# Patient Record
Sex: MALE | Race: WHITE | NOT HISPANIC OR LATINO | URBAN - METROPOLITAN AREA
[De-identification: names, ages, dates, MRNs, and addresses within clinical notes are randomized per-mention and may not be internally consistent; named-entity substitution may affect disease eponyms.]

---

## 2017-05-10 ENCOUNTER — OFFICE VISIT - HEALTHEAST (OUTPATIENT)
Dept: FAMILY MEDICINE | Facility: CLINIC | Age: 47
End: 2017-05-10

## 2017-05-10 DIAGNOSIS — R05.8 POST-VIRAL COUGH SYNDROME: ICD-10-CM

## 2017-05-10 DIAGNOSIS — I10 ESSENTIAL HYPERTENSION: ICD-10-CM

## 2017-05-10 ASSESSMENT — MIFFLIN-ST. JEOR: SCORE: 1654.84

## 2021-05-31 VITALS — WEIGHT: 178 LBS | BODY MASS INDEX: 26.36 KG/M2 | HEIGHT: 69 IN

## 2021-06-10 NOTE — PROGRESS NOTES
"  Chief Complaint   Patient presents with     Cough     x2 wks - dry & non-productive, no fever         HPI:   Damion Gomes is a 46 y.o. male c/o cough for the last 2 weeks, non productive, no shortness of breath. Some post nasal drainage.  C/o problems with allergies.  Son was sick with cold.  Wondering about BP.  He monitors it at home and the bottom number is usually 90 or above.  Father has hypertension.  Identical twin brother doesn't.  He states he has had a lot of stress.  He has lost 30 lbs, smokes only an occasional cigarette, doesn't add salt to food, hasn't been exercising on a regular basis now.    ROS:  A 12 point comprehensive review of systems was negative except as noted.     Medications:  No current outpatient prescriptions on file prior to visit.     No current facility-administered medications on file prior to visit.          Social History:  Social History   Substance Use Topics     Smoking status: Former Smoker     Packs/day: 1.00     Years: 25.00     Smokeless tobacco: Not on file     Alcohol use Not on file         Physical Exam:   Vitals:    05/10/17 1147   BP: (!) 138/100   Patient Site: Left Arm   Patient Position: Sitting   Cuff Size: Adult Regular   Pulse: 88   Resp: 16   Temp: 98.1  F (36.7  C)   TempSrc: Oral   SpO2: 98%   Weight: 178 lb (80.7 kg)   Height: 5' 8.5\" (1.74 m)       GENERAL:   Alert. Oriented.  EYES: Clear  HENT:  Ears: R TM pearly gray. Normal landmarks. L TM pearly gray.  Normal landmarks  Nose: Clear.  Sinuses: Nontender.  Oropharynx:  No erythema. No exudate.  NECK: Supple. No adenopathy.  LUNGS: Clear to ascultation.  No crackles.  No wheezing  HEART: RRR  SKIN:  No rash.         Assessment/Plan:    1. Post-viral cough syndrome  Lungs clear.  May have some post nasal drainage.  Can try some antihistamine and/or sudafed    2. Essential hypertension  He will continue to monitor his blood pressure.  Work on increasing exercise.  If continues to run a diastolic over " 90, to be rechecked in clinic and bring BP log.       The following portions of the patient's history were reviewed and updated as appropriate: allergies, current medications, past family history, past medical history, past social history, past surgical history and problem list.    Rodney Slaughter MD      5/10/2017